# Patient Record
(demographics unavailable — no encounter records)

---

## 2024-10-09 NOTE — CHIEF COMPLAINT
[FreeTextEntry1] : 35 YO F PRESENTS FOR POSSIBLE INFECTION. PT STATES OVER THE PAST FEW DAYS SHE'S DEVELOPED THIN WHITE/YELLOW DISCHARGE AND VAGINAL ITCHING. PT ADMITS TO WORKING OUT AND STATES SHE STARTED TO DEVELOP THESE SX AFTERWARDS. HAS ONLY BEEN USING COCONUT OIL. DENIES ANY URINARY SX.